# Patient Record
Sex: FEMALE | Race: BLACK OR AFRICAN AMERICAN | NOT HISPANIC OR LATINO | URBAN - METROPOLITAN AREA
[De-identification: names, ages, dates, MRNs, and addresses within clinical notes are randomized per-mention and may not be internally consistent; named-entity substitution may affect disease eponyms.]

---

## 2021-04-21 ENCOUNTER — OFFICE VISIT (OUTPATIENT)
Dept: URBAN - METROPOLITAN AREA CLINIC 40 | Facility: CLINIC | Age: 26
End: 2021-04-21

## 2021-04-23 ENCOUNTER — OFFICE VISIT (OUTPATIENT)
Dept: URBAN - METROPOLITAN AREA CLINIC 40 | Facility: CLINIC | Age: 26
End: 2021-04-23

## 2021-04-29 ENCOUNTER — DASHBOARD ENCOUNTERS (OUTPATIENT)
Age: 26
End: 2021-04-29

## 2021-04-29 ENCOUNTER — OFFICE VISIT (OUTPATIENT)
Dept: URBAN - METROPOLITAN AREA CLINIC 40 | Facility: CLINIC | Age: 26
End: 2021-04-29
Payer: COMMERCIAL

## 2021-04-29 DIAGNOSIS — R10.84 ABDOMINAL PAIN, GENERALIZED: ICD-10-CM

## 2021-04-29 DIAGNOSIS — K58.1 IRRITABLE BOWEL SYNDROME WITH CONSTIPATION: ICD-10-CM

## 2021-04-29 DIAGNOSIS — K62.5 RECTAL BLEEDING: ICD-10-CM

## 2021-04-29 PROBLEM — 440630006: Status: ACTIVE | Noted: 2021-04-29

## 2021-04-29 PROCEDURE — 99243 OFF/OP CNSLTJ NEW/EST LOW 30: CPT | Performed by: INTERNAL MEDICINE

## 2021-04-29 RX ORDER — ESCITALOPRAM OXALATE 10 MG/1
TABLET ORAL
Qty: 30 UNSPECIFIED | Status: ACTIVE | COMMUNITY

## 2021-04-29 RX ORDER — TEGASEROD 6 MG/1
1 TABLET TABLET ORAL TWICE A DAY
Qty: 180 TABLET | Refills: 3 | OUTPATIENT
Start: 2021-04-29

## 2021-04-29 RX ORDER — DEXTROAMPHETAMINE SACCHARATE, AMPHETAMINE ASPARTATE, DEXTROAMPHETAMINE SULFATE AND AMPHETAMINE SULFATE 5; 5; 5; 5 MG/1; MG/1; MG/1; MG/1
TABLET ORAL
Qty: 60 UNSPECIFIED | Status: DISCONTINUED | COMMUNITY

## 2021-04-29 RX ORDER — DEXTROAMPHETAMINE SACCHARATE, AMPHETAMINE ASPARTATE, DEXTROAMPHETAMINE SULFATE, AND AMPHETAMINE SULFATE 5; 5; 5; 5 MG/1; MG/1; MG/1; MG/1
1 TABLET TABLET ORAL TWICE A DAY
Status: ACTIVE | COMMUNITY

## 2021-04-29 NOTE — HPI-TODAY'S VISIT:
Pt is referred by Dr. James Esteban. A copy of this note will be provided for review. Patient presents for ration of irritable bowel syndrome.  She admits that currently she is doing much better than she was over the past few years.  At one point she had moderate to severe irritable bowel syndrome with alternating diarrhea constipation, severe abdominal pain, and a weight loss at that time due to altering her diet.  Over the past year she has done much better, she is nondistressed as she used to be and currently this is more constipation predominant irritable bowel syndrome.  She does describe ongoing left flank abdominal distention and cramping pain with sometimes moderate to significant pain.  Generally relieved after defecation.  Bowels move every 1 to 3 days, there is straining to pass stool, on occasion she has had to use manual disimpaction but not recently.  She has rare rectal white bleeding on the tissue paper but no overt hemorrhoids or anal pain.  Currently her weight has been stable in the 130 range.  No current acid reflux or nausea vomiting symptoms.  She denies IBD symptoms, ocular symptoms, no additional red flag symptoms noted.  No family history of colon cancer.  Recent blood work was normal Dr. Esteban's office per the patient.  She does not use laxatives or other medications at this time.

## 2021-06-24 ENCOUNTER — OFFICE VISIT (OUTPATIENT)
Dept: URBAN - METROPOLITAN AREA CLINIC 40 | Facility: CLINIC | Age: 26
End: 2021-06-24

## 2025-04-01 NOTE — PHYSICAL EXAM EYES:
.  Chief Complaint   Patient presents with    Medicare AWV   .  \"Have you been to the ER, urgent care clinic since your last visit?  Hospitalized since your last visit?\"    NO    “Have you seen or consulted any other health care providers outside our system since your last visit?”    YES - When: approximately 1 months ago.  Where and Why: Dr. Gonsalez for podiatry care.      “Have you had a diabetic eye exam?”    YES - Where: Dr. Marcell Jackson at [facility]  Nurse/CMA to request most recent records if not in the chart     Date of last diabetic eye exam: 2/5/2018     ./84 (BP Site: Right Upper Arm, Patient Position: Sitting, BP Cuff Size: Large Adult)   Pulse 68   Ht 1.575 m (5' 2\")   Wt 94.3 kg (208 lb)   SpO2 98%   BMI 38.04 kg/m²      Conjuntivae and eyelids appear normal,  Sclerae : White without injection